# Patient Record
Sex: MALE | Race: WHITE | NOT HISPANIC OR LATINO
[De-identification: names, ages, dates, MRNs, and addresses within clinical notes are randomized per-mention and may not be internally consistent; named-entity substitution may affect disease eponyms.]

---

## 2017-05-10 ENCOUNTER — APPOINTMENT (OUTPATIENT)
Dept: ORTHOPEDIC SURGERY | Facility: CLINIC | Age: 37
End: 2017-05-10

## 2017-05-10 VITALS
HEIGHT: 68 IN | WEIGHT: 198 LBS | SYSTOLIC BLOOD PRESSURE: 134 MMHG | BODY MASS INDEX: 30.01 KG/M2 | HEART RATE: 69 BPM | DIASTOLIC BLOOD PRESSURE: 98 MMHG

## 2017-05-10 DIAGNOSIS — M50.20 OTHER CERVICAL DISC DISPLACEMENT, UNSPECIFIED CERVICAL REGION: ICD-10-CM

## 2017-06-21 ENCOUNTER — APPOINTMENT (OUTPATIENT)
Dept: ORTHOPEDIC SURGERY | Facility: CLINIC | Age: 37
End: 2017-06-21

## 2017-06-21 VITALS
HEIGHT: 68 IN | SYSTOLIC BLOOD PRESSURE: 135 MMHG | WEIGHT: 198 LBS | DIASTOLIC BLOOD PRESSURE: 85 MMHG | HEART RATE: 69 BPM | BODY MASS INDEX: 30.01 KG/M2

## 2017-06-21 DIAGNOSIS — M48.02 SPINAL STENOSIS, CERVICAL REGION: ICD-10-CM

## 2017-06-22 PROBLEM — M48.02 DEGENERATIVE CERVICAL SPINAL STENOSIS: Status: ACTIVE | Noted: 2017-05-10

## 2017-06-29 ENCOUNTER — APPOINTMENT (OUTPATIENT)
Dept: OPHTHALMOLOGY | Facility: CLINIC | Age: 37
End: 2017-06-29

## 2017-06-29 DIAGNOSIS — H53.8 OTHER VISUAL DISTURBANCES: ICD-10-CM

## 2018-01-15 ENCOUNTER — CHART COPY (OUTPATIENT)
Age: 38
End: 2018-01-15

## 2018-06-12 ENCOUNTER — APPOINTMENT (OUTPATIENT)
Dept: OPHTHALMOLOGY | Facility: CLINIC | Age: 38
End: 2018-06-12
Payer: MEDICARE

## 2018-06-12 DIAGNOSIS — H10.45 OTHER CHRONIC ALLERGIC CONJUNCTIVITIS: ICD-10-CM

## 2018-06-12 PROCEDURE — 92014 COMPRE OPH EXAM EST PT 1/>: CPT

## 2019-09-05 ENCOUNTER — APPOINTMENT (OUTPATIENT)
Dept: OPHTHALMOLOGY | Facility: CLINIC | Age: 39
End: 2019-09-05
Payer: MEDICARE

## 2019-09-05 ENCOUNTER — NON-APPOINTMENT (OUTPATIENT)
Age: 39
End: 2019-09-05

## 2019-09-05 PROCEDURE — 92014 COMPRE OPH EXAM EST PT 1/>: CPT

## 2019-09-05 PROCEDURE — 92133 CPTRZD OPH DX IMG PST SGM ON: CPT

## 2019-09-24 ENCOUNTER — NON-APPOINTMENT (OUTPATIENT)
Age: 39
End: 2019-09-24

## 2019-09-24 ENCOUNTER — APPOINTMENT (OUTPATIENT)
Dept: OPHTHALMOLOGY | Facility: CLINIC | Age: 39
End: 2019-09-24
Payer: MEDICARE

## 2019-09-24 PROCEDURE — 92083 EXTENDED VISUAL FIELD XM: CPT

## 2019-09-24 PROCEDURE — 92012 INTRM OPH EXAM EST PATIENT: CPT

## 2019-09-24 PROCEDURE — 92133 CPTRZD OPH DX IMG PST SGM ON: CPT

## 2020-09-08 ENCOUNTER — APPOINTMENT (OUTPATIENT)
Dept: OPHTHALMOLOGY | Facility: CLINIC | Age: 40
End: 2020-09-08
Payer: MEDICARE

## 2020-09-08 ENCOUNTER — NON-APPOINTMENT (OUTPATIENT)
Age: 40
End: 2020-09-08

## 2020-09-08 PROCEDURE — 92133 CPTRZD OPH DX IMG PST SGM ON: CPT

## 2020-09-08 PROCEDURE — 92014 COMPRE OPH EXAM EST PT 1/>: CPT

## 2021-09-25 ENCOUNTER — EMERGENCY (EMERGENCY)
Facility: HOSPITAL | Age: 41
LOS: 1 days | Discharge: HOME | End: 2021-09-25
Attending: STUDENT IN AN ORGANIZED HEALTH CARE EDUCATION/TRAINING PROGRAM
Payer: MEDICARE

## 2021-09-25 VITALS
HEART RATE: 82 BPM | SYSTOLIC BLOOD PRESSURE: 142 MMHG | OXYGEN SATURATION: 97 % | RESPIRATION RATE: 18 BRPM | TEMPERATURE: 98 F | DIASTOLIC BLOOD PRESSURE: 84 MMHG

## 2021-09-25 LAB — SARS-COV-2 RNA SPEC QL NAA+PROBE: SIGNIFICANT CHANGE UP

## 2021-09-25 PROCEDURE — 99283 EMERGENCY DEPT VISIT LOW MDM: CPT

## 2021-09-25 PROCEDURE — U0003: CPT

## 2021-09-25 PROCEDURE — U0005: CPT

## 2021-09-25 NOTE — ED STATDOCS - PATIENT PORTAL LINK FT
You can access the FollowMyHealth Patient Portal offered by [facility] by registering at the following website: [email or web address]. By joining [facility]’s FollowMyHealth portal, you will also be able to view your health information using other applications (apps) compatible with our system.

## 2021-09-25 NOTE — ED STATDOCS - PHYSICAL EXAMINATION
Vital Signs per nursing documentation  Gen: well appearing, no acute distress  HEENT: NCAT, MMM  Cardiac: regular rate rhythm, normal S1S2  Chest: clear to auscultation bilateral, no wheezes or crackles  Abdomen: soft, non tender non distended  Extremity: no gross deformity, good perfusion  Skin: no rash  Neuro: nonfocal neuro exam, gait steady Vital Signs per nursing documentation  Gen: well appearing, no acute distress  HEENT: NCAT, MMM  Cardiac: regular rate rhythm, normal S1S2  Chest: clear to auscultation bilateral, no wheezes or crackles  Abdomen: soft, non tender non distended  Extremity: no gross deformity  Skin: no rash  Neuro: nonfocal neuro exam, gait steady

## 2021-09-25 NOTE — ED STATDOCS - NS ED ROS FT
ROS:  GEN: (-) fevers/chills, (+) congestion  NECK: (-) stiffness, (-) swelling  RESP: (-) shortness of breath, (-) cough  CV: (-) chest pain, (-) palpitations  GI: (-) nausea, (-) vomiting, (-) pain, (-) constipation, (-) diarrhea  : (-) hematuria, (-) dysuria  EXT: (-) edema  NEURO: (-) weakness, (-) headache, (-) dizziness, (-) syncope  MSK: (-) muscle pain

## 2021-09-25 NOTE — ED ADULT TRIAGE NOTE - CHIEF COMPLAINT QUOTE
Patient here for COVID testing, patient is currently having symptoms. Pt reports congestions and headache

## 2021-11-18 ENCOUNTER — NON-APPOINTMENT (OUTPATIENT)
Age: 41
End: 2021-11-18

## 2021-11-18 ENCOUNTER — APPOINTMENT (OUTPATIENT)
Dept: OPHTHALMOLOGY | Facility: CLINIC | Age: 41
End: 2021-11-18
Payer: MEDICARE

## 2021-11-18 PROCEDURE — 92014 COMPRE OPH EXAM EST PT 1/>: CPT

## 2021-11-18 PROCEDURE — 92083 EXTENDED VISUAL FIELD XM: CPT

## 2022-08-02 PROBLEM — Z78.9 OTHER SPECIFIED HEALTH STATUS: Chronic | Status: ACTIVE | Noted: 2021-09-25

## 2022-09-20 ENCOUNTER — APPOINTMENT (OUTPATIENT)
Dept: ORTHOPEDIC SURGERY | Facility: CLINIC | Age: 42
End: 2022-09-20

## 2022-09-20 VITALS — BODY MASS INDEX: 33.04 KG/M2 | HEIGHT: 68 IN | WEIGHT: 218 LBS

## 2022-09-20 PROCEDURE — 99214 OFFICE O/P EST MOD 30 MIN: CPT

## 2022-09-20 PROCEDURE — 73562 X-RAY EXAM OF KNEE 3: CPT | Mod: RT

## 2022-09-20 RX ORDER — EPINEPHRINE 0.3 MG/.3ML
0.3 INJECTION INTRAMUSCULAR
Qty: 2 | Refills: 0 | Status: ACTIVE | COMMUNITY
Start: 2022-09-16

## 2022-09-20 RX ORDER — BUDESONIDE AND FORMOTEROL FUMARATE DIHYDRATE 160; 4.5 UG/1; UG/1
160-4.5 AEROSOL RESPIRATORY (INHALATION)
Qty: 10 | Refills: 0 | Status: ACTIVE | COMMUNITY
Start: 2022-05-17

## 2022-09-20 RX ORDER — MUPIROCIN 20 MG/G
2 OINTMENT TOPICAL
Qty: 22 | Refills: 0 | Status: ACTIVE | COMMUNITY
Start: 2022-09-12

## 2022-09-20 RX ORDER — LOTEPREDNOL ETABONATE 5 MG/G
0.5 OINTMENT OPHTHALMIC
Qty: 4 | Refills: 0 | Status: ACTIVE | COMMUNITY
Start: 2022-03-08

## 2022-09-20 RX ORDER — BUPROPION HYDROCHLORIDE 75 MG/1
75 TABLET, FILM COATED ORAL
Qty: 30 | Refills: 0 | Status: ACTIVE | COMMUNITY
Start: 2022-02-15

## 2022-09-20 RX ORDER — LEVALBUTEROL TARTRATE 45 UG/1
45 AEROSOL, METERED ORAL
Qty: 15 | Refills: 0 | Status: ACTIVE | COMMUNITY
Start: 2022-09-01

## 2022-09-20 RX ORDER — OMEPRAZOLE 20 MG/1
20 CAPSULE, DELAYED RELEASE ORAL
Qty: 30 | Refills: 0 | Status: ACTIVE | COMMUNITY
Start: 2022-05-10

## 2022-09-20 RX ORDER — CETIRIZINE HYDROCHLORIDE 5 MG/1
5 TABLET ORAL
Qty: 60 | Refills: 0 | Status: ACTIVE | COMMUNITY
Start: 2021-07-13

## 2022-09-20 RX ORDER — ECONAZOLE NITRATE 10 MG/G
1 CREAM TOPICAL
Qty: 85 | Refills: 0 | Status: ACTIVE | COMMUNITY
Start: 2022-05-16

## 2022-09-20 NOTE — IMAGING
[de-identified] : Normal gait\par \par Cervical spine\par Inspection normal\par Good active range of motion\par No tenderness\par -negative foraminal closing test bilaterally\par \par Right shoulder\par No swelling\par Mild tenderness anterior shoulder\par Active forward flexion 180°, external rotation 60°, internal rotation lower lumbar region\par Positive impingement 170°\par Supraspinatus strength 5 minus/5\par Radial pulse 2+\par \par Right knee\par No swelling\par Mild medial facet tenderness\par Mild lateral joint line tenderness\par Passive range of motion 0° to 125°\par Ligaments are stable\par Quad strength 5 minus/5\par \par Right leg\par No swelling\par Calf is soft and nontender\par Posterior tibial pulse 2+ [Right] : right knee [FreeTextEntry9] : Reviewed and interpreted.  Right knee AP standing, lateral, sunrise views-mild degenerative changes with narrowing of the lateral compartment on AP standing view, spurring patellofemoral joint

## 2022-09-20 NOTE — DISCUSSION/SUMMARY
[de-identified] : Continue home PT once a twice a week\par Ice p.r.n.\par Tylenol p.r.n.\par He will avoid irritating activities and exercises\par I discussed possible repeat Gelsyn injections right knee and possibly trying this in his right shoulder\par \par Impression:\par Status post injury right shoulder November 20, 2021\par Rotator cuff strain right shoulder/impingement/posterior subluxation/mild osteoarthritis\par Mild osteoarthritis right knee/chondromalacia patella Detail Level: Zone

## 2022-09-20 NOTE — HISTORY OF PRESENT ILLNESS
[Gradual] : gradual [5] : 5 [Dull/Aching] : dull/aching [Intermittent] : intermittent [Rest] : rest [Meds] : meds [Standing] : standing [Full time] : Work status: full time [de-identified] : Date of injury right shoulder November 20, 2021\par The patient has continued pain right shoulder.  Mild pain reaching above him and behind him.  No awakening from sleep at night.  No slipping\par He has continued pain right knee.  Mild pain standing and walking.  Pain with stairs and movie sign.  He did have some improvement after previous Gelsyn injections right knee.\par He has been doing PT there taking Tylenol p.r.n.  Seen today with his mother, Diane [] : Post Surgical Visit: no [FreeTextEntry9] : Tylenol [de-identified] : 10/12/2021 [de-identified] : Dr. Buitrago  [de-identified] : [employer] [de-identified] : 10/12/2021

## 2022-11-22 ENCOUNTER — NON-APPOINTMENT (OUTPATIENT)
Age: 42
End: 2022-11-22

## 2022-11-22 ENCOUNTER — APPOINTMENT (OUTPATIENT)
Dept: OPHTHALMOLOGY | Facility: CLINIC | Age: 42
End: 2022-11-22

## 2022-11-22 PROCEDURE — 92014 COMPRE OPH EXAM EST PT 1/>: CPT

## 2022-11-22 PROCEDURE — 92133 CPTRZD OPH DX IMG PST SGM ON: CPT

## 2022-11-22 PROCEDURE — 92083 EXTENDED VISUAL FIELD XM: CPT

## 2022-12-13 ENCOUNTER — APPOINTMENT (OUTPATIENT)
Dept: ORTHOPEDIC SURGERY | Facility: CLINIC | Age: 42
End: 2022-12-13

## 2022-12-13 VITALS — WEIGHT: 218 LBS | HEIGHT: 68 IN | BODY MASS INDEX: 33.04 KG/M2

## 2022-12-13 PROCEDURE — 99214 OFFICE O/P EST MOD 30 MIN: CPT

## 2022-12-13 RX ORDER — TOBRAMYCIN AND DEXAMETHASONE 3; 1 MG/ML; MG/ML
0.3-0.1 SUSPENSION/ DROPS OPHTHALMIC
Qty: 10 | Refills: 0 | Status: ACTIVE | COMMUNITY
Start: 2022-10-14

## 2022-12-13 RX ORDER — PREDNISONE 1 MG/1
1 TABLET ORAL
Qty: 50 | Refills: 0 | Status: COMPLETED | COMMUNITY
Start: 2022-07-15 | End: 2022-12-13

## 2022-12-13 RX ORDER — BUDESONIDE, GLYCOPYRROLATE, AND FORMOTEROL FUMARATE 160; 9; 4.8 UG/1; UG/1; UG/1
160-9-4.8 AEROSOL, METERED RESPIRATORY (INHALATION)
Qty: 11 | Refills: 0 | Status: ACTIVE | COMMUNITY
Start: 2022-12-02

## 2022-12-13 RX ORDER — LEVALBUTEROL HYDROCHLORIDE 0.63 MG/3ML
0.63 SOLUTION RESPIRATORY (INHALATION)
Qty: 180 | Refills: 0 | Status: ACTIVE | COMMUNITY
Start: 2022-12-02

## 2022-12-13 RX ORDER — ALBUTEROL SULFATE 2.5 MG/3ML
(2.5 MG/3ML) SOLUTION RESPIRATORY (INHALATION)
Qty: 150 | Refills: 0 | Status: ACTIVE | COMMUNITY
Start: 2022-10-10

## 2022-12-13 RX ORDER — MEPOLIZUMAB 100 MG/ML
INJECTION, POWDER, FOR SOLUTION SUBCUTANEOUS
Refills: 0 | Status: ACTIVE | COMMUNITY

## 2022-12-13 NOTE — IMAGING
[de-identified] : Normal gait\par \par Cervical spine\par Inspection normal\par Good active range of motion\par No tenderness\par -negative foraminal closing test bilaterally\par \par Thoracic spine\par Inspection normal\par Minimal tenderness paraspinals upper thoracic region \par Straight leg raising is negative bilaterally\par \par Right shoulder\par No swelling\par Mild tenderness anterior shoulder\par Active forward flexion 180°, external rotation 60°, internal rotation lower lumbar region\par Positive impingement 170°\par Supraspinatus strength 5 minus/5\par Radial pulse 2+\par \par Right knee\par No swelling\par Slight medial facet tenderness\par Slight lateral joint line tenderness\par Passive range of motion 0° to 125°\par Ligaments are stable\par Quad strength 4+/5\par \par Right leg\par No swelling\par Calf is soft and nontender\par Posterior tibial pulse 2+

## 2022-12-13 NOTE — HISTORY OF PRESENT ILLNESS
[Right Arm] : right arm [Gradual] : gradual [7] : 7 [Dull/Aching] : dull/aching [Intermittent] : intermittent [Household chores] : household chores [Leisure] : leisure [Rest] : rest [Exercising] : exercising [Full time] : Work status: full time [de-identified] : The patient has occasional mild pain right shoulder with activities.  No slipping.  No awakening from sleep at night\par He has occasional mild pain and discomfort in his mid back region.  No radicular complaints.  No chest pain or shortness of breath\par Occasional mild pain right knee with activities.  He does feel better after Gelsyn injections.  Doing home PT.  Taking Tylenol p.r.n.  Seen today with his mother, Diane [] : Post Surgical Visit: no [de-identified] : 9/20/2022 [de-identified] : Dr. Buitrago

## 2022-12-13 NOTE — DISCUSSION/SUMMARY
[de-identified] : Continue home PT once a twice a week\par Ice p.r.n.\par Tylenol p.r.n.\par He will avoid irritating activities and exercises\par I discussed possible repeat Gelsyn injections right knee if his symptoms worsen\par \par Impression:\par Status post injury right shoulder November 20, 2021\par Rotator cuff strain right shoulder/impingement/posterior subluxation/mild osteoarthritis\par Thoracic strain/spondylosis\par Mild osteoarthritis right knee/chondromalacia patella

## 2023-01-03 ENCOUNTER — NON-APPOINTMENT (OUTPATIENT)
Age: 43
End: 2023-01-03

## 2023-01-03 ENCOUNTER — APPOINTMENT (OUTPATIENT)
Dept: OPHTHALMOLOGY | Facility: CLINIC | Age: 43
End: 2023-01-03
Payer: MEDICARE

## 2023-01-03 PROCEDURE — 92133 CPTRZD OPH DX IMG PST SGM ON: CPT

## 2023-01-03 PROCEDURE — 99214 OFFICE O/P EST MOD 30 MIN: CPT

## 2023-01-03 PROCEDURE — 92083 EXTENDED VISUAL FIELD XM: CPT

## 2023-03-28 ENCOUNTER — APPOINTMENT (OUTPATIENT)
Dept: ORTHOPEDIC SURGERY | Facility: CLINIC | Age: 43
End: 2023-03-28
Payer: MEDICARE

## 2023-03-28 VITALS — BODY MASS INDEX: 33.04 KG/M2 | HEIGHT: 68 IN | WEIGHT: 218 LBS

## 2023-03-28 PROCEDURE — 99214 OFFICE O/P EST MOD 30 MIN: CPT

## 2023-03-28 RX ORDER — COVID-19 ANTIGEN TEST
KIT MISCELLANEOUS
Qty: 8 | Refills: 0 | Status: COMPLETED | COMMUNITY
Start: 2022-11-01 | End: 2023-03-28

## 2023-03-28 RX ORDER — LOSARTAN POTASSIUM 100 MG/1
100 TABLET, FILM COATED ORAL
Refills: 0 | Status: ACTIVE | COMMUNITY

## 2023-03-28 RX ORDER — LOSARTAN POTASSIUM 50 MG/1
50 TABLET, FILM COATED ORAL
Qty: 30 | Refills: 0 | Status: COMPLETED | COMMUNITY
Start: 2022-07-19 | End: 2023-03-28

## 2023-03-28 RX ORDER — LOSARTAN POTASSIUM 25 MG/1
25 TABLET, FILM COATED ORAL
Qty: 30 | Refills: 0 | Status: COMPLETED | COMMUNITY
Start: 2022-06-07 | End: 2023-03-28

## 2023-03-28 NOTE — IMAGING
[de-identified] : Normal gait\par \par Cervical spine\par Inspection normal\par Good active range of motion\par No tenderness\par -negative foraminal closing test bilaterally\par \par Thoracic spine\par Inspection normal\par Minimal tenderness paraspinals upper thoracic region \par Straight leg raising is negative bilaterally\par \par Right shoulder\par No swelling\par Mild tenderness anterior shoulder\par Active forward flexion 170°, external rotation 60°, internal rotation lower lumbar region\par Positive impingement 170°\par Supraspinatus strength 5 minus/5\par Radial pulse 2+\par \par Right knee\par No swelling\par Slight medial facet tenderness\par Slight lateral joint line tenderness\par Passive range of motion 0° to 125°\par Ligaments are stable\par Quad strength 4+/5\par \par Right leg\par No swelling\par Calf is soft and nontender\par Posterior tibial pulse 2+

## 2023-03-28 NOTE — DISCUSSION/SUMMARY
[de-identified] : Continue home PT once a twice a week\par Ice p.r.n.\par Tylenol p.r.n.\par He will avoid irritating activities and exercises\par Again, I discussed possible repeat Gelsyn injections right knee if his symptoms worsen\par \par Impression:\par Status post injury right shoulder November 20, 2021\par Rotator cuff strain right shoulder/impingement/posterior subluxation/mild osteoarthritis\par Thoracic strain/spondylosis\par Mild osteoarthritis right knee/chondromalacia patella

## 2023-03-28 NOTE — HISTORY OF PRESENT ILLNESS
[Gradual] : gradual [5] : 5 [Dull/Aching] : dull/aching [Intermittent] : intermittent [Physical therapy] : physical therapy [Standing] : standing [Full time] : Work status: full time [de-identified] : The patient has been doing home physical therapy.  He is feeling a little better\par He has mild pain right shoulder with movement.  Mild stiffness in his shoulder.  No awakening from sleep at night.\par He has mild occasional pain in his mid back region with change in position.  No radicular complaints.  No chest pain or shortness of breath\par He has occasional mild pain in his knee.  Mild pain standing and walking.  Mild pain with stairs and movie sign.  Taking Tylenol as needed.  Seen today with his mother, Diane [] : Post Surgical Visit: no [de-identified] : 12/13/22 [de-identified] : Dr. Buitrago

## 2023-07-10 ENCOUNTER — NON-APPOINTMENT (OUTPATIENT)
Age: 43
End: 2023-07-10

## 2023-07-18 ENCOUNTER — APPOINTMENT (OUTPATIENT)
Dept: ORTHOPEDIC SURGERY | Facility: CLINIC | Age: 43
End: 2023-07-18
Payer: MEDICARE

## 2023-07-18 VITALS — HEIGHT: 68 IN | BODY MASS INDEX: 33.04 KG/M2 | WEIGHT: 218 LBS

## 2023-07-18 PROCEDURE — 99214 OFFICE O/P EST MOD 30 MIN: CPT

## 2023-07-18 NOTE — DISCUSSION/SUMMARY
[de-identified] : Continue home OT once a twice a week\par Ice p.r.n.\par Tylenol p.r.n.\par He will avoid irritating activities and exercises\par Again, I discussed possible repeat Gelsyn injections right knee if his symptoms worsen\par \par Impression:\par Status post injury right shoulder November 20, 2021\par Rotator cuff strain right shoulder/impingement/posterior subluxation/mild osteoarthritis\par Thoracic strain/spondylosis\par Mild osteoarthritis right knee/chondromalacia patella

## 2023-07-18 NOTE — IMAGING
[de-identified] : Normal gait\par \par Cervical spine\par Inspection normal\par Good active range of motion\par No tenderness\par -negative foraminal closing test bilaterally\par \par Thoracic spine\par Inspection normal.\par No tenderness\par Straight leg raising is negative bilaterally\par \par Right shoulder\par No swelling\par Slight tenderness anterior shoulder\par Active forward flexion 170°, external rotation 60°, internal rotation lower lumbar region\par Positive impingement 170°\par Supraspinatus strength 5 minus/5\par Radial pulse 2+\par \par Right knee\par No swelling\par Slight medial facet tenderness\par Slight lateral joint line tenderness\par Passive range of motion 0° to 125°\par Ligaments are stable\par Quad strength 5 -/5\par \par Right leg\par No swelling\par Calf is soft and nontender\par Posterior tibial pulse 2+

## 2023-07-18 NOTE — HISTORY OF PRESENT ILLNESS
[Gradual] : gradual [5] : 5 [Dull/Aching] : dull/aching [Intermittent] : intermittent [Stairs] : stairs [Full time] : Work status: full time [de-identified] : The patient has been doing home therapy once or twice a week.  Doing home exercises\par He has mild occasional pain right shoulder with movement.  No awakening from sleep at night\par He has mild discomfort occasionally in his mid back region.  No pain.  No radicular complaints.  No chest pain or shortness of breath\par He has mild occasional pain right knee with stairs.\par Seen today with his mother, Diane [] : Post Surgical Visit: no [de-identified] : 3/28/23 [de-identified] : Dr. Buitrago [de-identified] : 10/2021

## 2023-07-18 NOTE — REASON FOR VISIT
[Parent] : parent [FreeTextEntry2] : Continued occasional pain right shoulder mid back and right knee

## 2023-11-14 ENCOUNTER — APPOINTMENT (OUTPATIENT)
Dept: ORTHOPEDIC SURGERY | Facility: CLINIC | Age: 43
End: 2023-11-14
Payer: MEDICARE

## 2023-11-14 ENCOUNTER — APPOINTMENT (OUTPATIENT)
Dept: MRI IMAGING | Facility: CLINIC | Age: 43
End: 2023-11-14
Payer: MEDICARE

## 2023-11-14 VITALS — HEIGHT: 68 IN | BODY MASS INDEX: 33.04 KG/M2 | WEIGHT: 218 LBS

## 2023-11-14 DIAGNOSIS — M75.41 IMPINGEMENT SYNDROME OF RIGHT SHOULDER: ICD-10-CM

## 2023-11-14 PROCEDURE — 73030 X-RAY EXAM OF SHOULDER: CPT | Mod: RT

## 2023-11-14 PROCEDURE — 99214 OFFICE O/P EST MOD 30 MIN: CPT

## 2023-11-14 PROCEDURE — 72148 MRI LUMBAR SPINE W/O DYE: CPT | Mod: MH

## 2023-11-14 PROCEDURE — 72100 X-RAY EXAM L-S SPINE 2/3 VWS: CPT

## 2023-11-14 PROCEDURE — 72070 X-RAY EXAM THORAC SPINE 2VWS: CPT

## 2023-11-14 PROCEDURE — 72170 X-RAY EXAM OF PELVIS: CPT

## 2023-11-16 ENCOUNTER — NON-APPOINTMENT (OUTPATIENT)
Age: 43
End: 2023-11-16

## 2023-12-14 ENCOUNTER — APPOINTMENT (OUTPATIENT)
Dept: ORTHOPEDIC SURGERY | Facility: CLINIC | Age: 43
End: 2023-12-14
Payer: MEDICARE

## 2023-12-14 VITALS — HEIGHT: 68 IN | BODY MASS INDEX: 33.04 KG/M2 | WEIGHT: 218 LBS

## 2023-12-14 PROCEDURE — 99214 OFFICE O/P EST MOD 30 MIN: CPT

## 2023-12-14 NOTE — REASON FOR VISIT
[FreeTextEntry2] : Decreased pain right shoulder, mid back, lower back Occasional mild right knee pain

## 2023-12-14 NOTE — HISTORY OF PRESENT ILLNESS
[Mid-back] : mid-back [Lower back] : lower back [Right Arm] : right arm [Gradual] : gradual [3] : 3 [1] : 2 [Dull/Aching] : dull/aching [Intermittent] : intermittent [Leisure] : leisure [Rest] : rest [Physical therapy] : physical therapy [Exercising] : exercising [Not working due to injury] : Work status: not working due to injury [de-identified] : Date of injury November 12, 2023 The patient has been going to physical therapy.  He is feeling better.  He has mild occasional pain right shoulder with movement.  No awakening from sleep at night He has minimal pain in his mid lower back with change in position.  No radicular complaints Mild occasional pain right knee standing and walking He had MRI lumbosacral spine Seen today with his mother, Diane [de-identified] : 11/14/2023 [] : Post Surgical Visit: no [de-identified] : Dr. Buitrago

## 2023-12-14 NOTE — DISCUSSION/SUMMARY
[de-identified] : MRI lumbosacral spine was discussed with the patient and his mother Continue physical therapy He may return to work December 15, 2023 without restrictions.  He can use copper back support when working.  He will use this over the next 6 weeks then discontinue Ice for his right shoulder and right knee prn Moist heat to his back prn Tylenol p.r.n. He will avoid irritating activities and exercises  Impression: Status post fall November 12, 2023 Rotator cuff strain right shoulder/impingement/posterior subluxation/mild osteoarthritis Thoracic strain/spondylosis Lumbosacral strain/spondylosis/mild central disc herniation L5-S1 Mild osteoarthritis right knee/chondromalacia patella

## 2023-12-14 NOTE — DATA REVIEWED
[MRI] : MRI [Lumbar Spine] : lumbar spine [I independently reviewed and interpreted images and report] : I independently reviewed and interpreted images and report [FreeTextEntry1] : MRI lumbosacral spine done November 14, 2023 was reviewed. There is mild central disc protrusion L5-S1 with mild stenosis. Mild multilevel degenerative disc disease

## 2023-12-14 NOTE — IMAGING
[de-identified] : Normal gait  Thoracic spine Inspection normal. No tenderness Straight leg raising is negative bilaterally  Lumbosacral spine Inspection normal No tenderness Motor lower extremities-right quads 5 -/5, remainder normal  Hips Full painless passive range of motion.  No tenderness  Right shoulder No swelling Slight tenderness anterior shoulder Active forward flexion 170, external rotation 60, internal rotation lower lumbar region Positive impingement 170 Supraspinatus strength 5 minus/5 Radial pulse 2+  Right knee No swelling Slight medial facet tenderness Slight lateral joint line tenderness Passive range of motion 0 to 125 degrees Ligaments are stable   Right leg No swelling Calf is soft and nontender Posterior tibial pulse 2+

## 2024-01-02 ENCOUNTER — APPOINTMENT (OUTPATIENT)
Dept: OPHTHALMOLOGY | Facility: CLINIC | Age: 44
End: 2024-01-02
Payer: MEDICARE

## 2024-01-02 ENCOUNTER — NON-APPOINTMENT (OUTPATIENT)
Age: 44
End: 2024-01-02

## 2024-01-02 PROCEDURE — 92133 CPTRZD OPH DX IMG PST SGM ON: CPT

## 2024-01-02 PROCEDURE — 92014 COMPRE OPH EXAM EST PT 1/>: CPT

## 2024-02-06 ENCOUNTER — APPOINTMENT (OUTPATIENT)
Dept: ORTHOPEDIC SURGERY | Facility: CLINIC | Age: 44
End: 2024-02-06
Payer: MEDICARE

## 2024-02-06 VITALS — HEIGHT: 68 IN | WEIGHT: 218 LBS | BODY MASS INDEX: 33.04 KG/M2

## 2024-02-06 DIAGNOSIS — M51.26 OTHER INTERVERTEBRAL DISC DISPLACEMENT, LUMBAR REGION: ICD-10-CM

## 2024-02-06 DIAGNOSIS — M75.51 BURSITIS OF RIGHT SHOULDER: ICD-10-CM

## 2024-02-06 PROCEDURE — 99213 OFFICE O/P EST LOW 20 MIN: CPT

## 2024-02-06 NOTE — DISCUSSION/SUMMARY
[de-identified] : Continue physical therapy Ice for his right shoulder and right knee prn Moist heat to his back prn Tylenol p.r.n. He will avoid irritating activities and exercises I discussed possible Euflexxa injections right shoulder if symptoms worsen  Impression: Status post fall November 12, 2023 Rotator cuff strain right shoulder/impingement/posterior subluxation/mild osteoarthritis Thoracic strain/spondylosis Lumbosacral strain/spondylosis/mild central disc herniation L5-S1 Mild osteoarthritis right knee/chondromalacia patella

## 2024-02-06 NOTE — REASON FOR VISIT
[Parent] : parent [FreeTextEntry2] : Continued pain right shoulder, mid and lower back and right knee

## 2024-02-06 NOTE — IMAGING
[de-identified] : Normal gait  Thoracic spine Inspection normal. No tenderness Straight leg raising is negative bilaterally  Lumbosacral spine Inspection normal No tenderness Motor lower extremities-right quads 5 -/5, remainder normal  Hips Full painless passive range of motion.  No tenderness  Right shoulder No swelling Slight tenderness anterior shoulder Active forward flexion 170, external rotation 60, internal rotation lower lumbar region Negative impingement Supraspinatus strength 5 minus/5 Radial pulse 2+  Right knee No swelling Slight medial facet tenderness Slight lateral joint line tenderness Passive range of motion 0 to 125 degrees Ligaments are stable   Right leg No swelling Calf is soft and nontender Posterior tibial pulse 2+

## 2024-02-06 NOTE — HISTORY OF PRESENT ILLNESS
[Gradual] : gradual [Dull/Aching] : dull/aching [Constant] : constant [de-identified] : Date of injury November 12, 2023 The patient has been going to physical therapy.  He has been improving slowly.  He has mild occasional pain right shoulder with movement.  No awakening from sleep at night He has minimal pain in his mid and lower back with change in position.  No radicular complaints.  No chest pain or shortness of breath Mild occasional pain right knee standing and walking Seen today with his mother, Diane [] : Post Surgical Visit: no [de-identified] : 12/14/23 [de-identified] : Dr. Buitrago

## 2024-03-05 ENCOUNTER — APPOINTMENT (OUTPATIENT)
Dept: OPHTHALMOLOGY | Facility: CLINIC | Age: 44
End: 2024-03-05
Payer: MEDICARE

## 2024-03-05 ENCOUNTER — NON-APPOINTMENT (OUTPATIENT)
Age: 44
End: 2024-03-05

## 2024-03-05 PROCEDURE — 92083 EXTENDED VISUAL FIELD XM: CPT

## 2024-03-05 PROCEDURE — 92012 INTRM OPH EXAM EST PATIENT: CPT

## 2024-05-14 ENCOUNTER — APPOINTMENT (OUTPATIENT)
Dept: ORTHOPEDIC SURGERY | Facility: CLINIC | Age: 44
End: 2024-05-14
Payer: MEDICARE

## 2024-05-14 VITALS — WEIGHT: 218 LBS | BODY MASS INDEX: 33.04 KG/M2 | HEIGHT: 68 IN

## 2024-05-14 DIAGNOSIS — M47.817 SPONDYLOSIS W/OUT MYELOPATHY OR RADICULOPATHY, LUMBOSACRAL REGION: ICD-10-CM

## 2024-05-14 DIAGNOSIS — S39.012D STRAIN OF MUSCLE, FASCIA AND TENDON OF LOWER BACK, SUBSEQUENT ENCOUNTER: ICD-10-CM

## 2024-05-14 DIAGNOSIS — S29.019D STRAIN OF MUSCLE AND TENDON OF UNSPECIFIED WALL OF THORAX, SUBSEQUENT ENCOUNTER: ICD-10-CM

## 2024-05-14 DIAGNOSIS — M22.41 CHONDROMALACIA PATELLAE, RIGHT KNEE: ICD-10-CM

## 2024-05-14 DIAGNOSIS — M17.11 UNILATERAL PRIMARY OSTEOARTHRITIS, RIGHT KNEE: ICD-10-CM

## 2024-05-14 DIAGNOSIS — M51.24 OTHER INTERVERTEBRAL DISC DISPLACEMENT, THORACIC REGION: ICD-10-CM

## 2024-05-14 DIAGNOSIS — M19.011 PRIMARY OSTEOARTHRITIS, RIGHT SHOULDER: ICD-10-CM

## 2024-05-14 PROCEDURE — 99213 OFFICE O/P EST LOW 20 MIN: CPT

## 2024-05-14 RX ORDER — FERROUS SULFATE 325(65) MG
325 TABLET ORAL
Refills: 0 | Status: ACTIVE | COMMUNITY

## 2024-05-14 NOTE — DISCUSSION/SUMMARY
[de-identified] : Continue physical therapy Ice for his right shoulder and right knee prn Moist heat to his back prn Tylenol p.r.n. He will avoid irritating activities and exercises Again, I discussed possible Euflexxa injections right shoulder and right knee if symptoms worsen  Impression: Rotator cuff strain right shoulder/impingement/posterior subluxation/mild osteoarthritis Thoracic strain/spondylosis Lumbosacral strain/spondylosis/mild central disc herniation L5-S1 Mild osteoarthritis right knee/chondromalacia patella

## 2024-05-14 NOTE — HISTORY OF PRESENT ILLNESS
[Gradual] : gradual [Dull/Aching] : dull/aching [Constant] : constant [de-identified] : The patient has been going to physical therapy.  He says he has been feeling the same.  He has mild occasional pain right shoulder with movement.  No awakening from sleep at night He has minimal pain in his mid and lower back with change in position.  No radicular complaints.  No chest pain or shortness of breath Mild occasional pain right knee standing and walking.  Mild occasional pain with stairs Seen today with his mother, Diane [] : Post Surgical Visit: no [de-identified] : 2/6/2024 [de-identified] : Dr. Buitrago

## 2024-05-14 NOTE — IMAGING
[de-identified] : Normal gait  Thoracic spine Inspection normal. No tenderness Straight leg raising is negative bilaterally  Lumbosacral spine Inspection normal No tenderness Motor lower extremities-right quads 5 -/5, remainder normal  Hips Full painless passive range of motion.  No tenderness  Right shoulder No swelling Slight tenderness anterior shoulder Active forward flexion 170, external rotation 60, internal rotation lower lumbar region Negative impingement Supraspinatus strength 5 minus/5 Radial pulse 2+  Right knee No swelling Slight medial facet tenderness Passive range of motion 0 to 125 degrees Ligaments are stable   Right leg No swelling Calf is soft and nontender Posterior tibial pulse 2+

## 2024-08-13 ENCOUNTER — APPOINTMENT (OUTPATIENT)
Dept: ORTHOPEDIC SURGERY | Facility: CLINIC | Age: 44
End: 2024-08-13
Payer: MEDICARE

## 2024-08-13 VITALS — HEIGHT: 68 IN | BODY MASS INDEX: 33.04 KG/M2 | WEIGHT: 218 LBS

## 2024-08-13 DIAGNOSIS — M17.11 UNILATERAL PRIMARY OSTEOARTHRITIS, RIGHT KNEE: ICD-10-CM

## 2024-08-13 DIAGNOSIS — S39.012D STRAIN OF MUSCLE, FASCIA AND TENDON OF LOWER BACK, SUBSEQUENT ENCOUNTER: ICD-10-CM

## 2024-08-13 DIAGNOSIS — M75.51 BURSITIS OF RIGHT SHOULDER: ICD-10-CM

## 2024-08-13 DIAGNOSIS — M47.814 SPONDYLOSIS W/OUT MYELOPATHY OR RADICULOPATHY, THORACIC REGION: ICD-10-CM

## 2024-08-13 DIAGNOSIS — M47.817 SPONDYLOSIS W/OUT MYELOPATHY OR RADICULOPATHY, LUMBOSACRAL REGION: ICD-10-CM

## 2024-08-13 DIAGNOSIS — S29.019D STRAIN OF MUSCLE AND TENDON OF UNSPECIFIED WALL OF THORAX, SUBSEQUENT ENCOUNTER: ICD-10-CM

## 2024-08-13 DIAGNOSIS — M22.41 CHONDROMALACIA PATELLAE, RIGHT KNEE: ICD-10-CM

## 2024-08-13 PROCEDURE — 99213 OFFICE O/P EST LOW 20 MIN: CPT

## 2024-08-13 NOTE — DISCUSSION/SUMMARY
[de-identified] : Continue physical therapy Ice for his right shoulder and right knee prn Moist heat to his back prn Tylenol p.r.n. He will avoid irritating activities and exercises Again, I discussed possible Euflexxa injections right shoulder and right knee if symptoms worsen  Impression: Rotator cuff strain right shoulder/impingement/posterior subluxation/mild osteoarthritis Thoracic strain/spondylosis Lumbosacral strain/spondylosis/mild central disc herniation L5-S1 Mild osteoarthritis right knee/chondromalacia patella

## 2024-08-13 NOTE — IMAGING
[de-identified] : Normal gait  Thoracic spine Inspection normal. No tenderness Straight leg raising is negative bilaterally  Lumbosacral spine Inspection normal No tenderness Motor lower extremities-right quads 5 -/5, remainder normal  Hips Full painless passive range of motion.  No tenderness  Right shoulder No swelling No tenderness Active forward flexion 170, external rotation 60, internal rotation lower lumbar region Negative impingement Supraspinatus strength 5 minus/5 Radial pulse 2+  Right knee No swelling Slight medial facet tenderness Passive range of motion 0 to 125 degrees Ligaments are stable   Right leg No swelling Calf is soft and nontender Posterior tibial pulse 2+

## 2024-08-13 NOTE — HISTORY OF PRESENT ILLNESS
[Gradual] : gradual [Dull/Aching] : dull/aching [Constant] : constant [de-identified] : The patient has been going to physical therapy.  He feels better since last visit.  He has mild occasional pain right shoulder with movement.  No awakening from sleep at night He has minimal pain in his mid and lower back with change in position.  No radicular complaints.  No chest pain or shortness of breath Mild occasional pain right knee standing and walking.  Mild occasional pain with stairs Seen today with his mother, Diane [] : Post Surgical Visit: no [de-identified] : 5/14/2024 [de-identified] : Dr. Buitrago

## 2024-11-19 ENCOUNTER — APPOINTMENT (OUTPATIENT)
Dept: ORTHOPEDIC SURGERY | Facility: CLINIC | Age: 44
End: 2024-11-19
Payer: MEDICARE

## 2024-11-19 VITALS — WEIGHT: 218 LBS | BODY MASS INDEX: 33.04 KG/M2 | HEIGHT: 68 IN

## 2024-11-19 DIAGNOSIS — M47.814 SPONDYLOSIS W/OUT MYELOPATHY OR RADICULOPATHY, THORACIC REGION: ICD-10-CM

## 2024-11-19 DIAGNOSIS — M47.817 SPONDYLOSIS W/OUT MYELOPATHY OR RADICULOPATHY, LUMBOSACRAL REGION: ICD-10-CM

## 2024-11-19 DIAGNOSIS — M75.51 BURSITIS OF RIGHT SHOULDER: ICD-10-CM

## 2024-11-19 DIAGNOSIS — M22.41 CHONDROMALACIA PATELLAE, RIGHT KNEE: ICD-10-CM

## 2024-11-19 DIAGNOSIS — S29.019D STRAIN OF MUSCLE AND TENDON OF UNSPECIFIED WALL OF THORAX, SUBSEQUENT ENCOUNTER: ICD-10-CM

## 2024-11-19 DIAGNOSIS — S39.012D STRAIN OF MUSCLE, FASCIA AND TENDON OF LOWER BACK, SUBSEQUENT ENCOUNTER: ICD-10-CM

## 2024-11-19 DIAGNOSIS — M17.11 UNILATERAL PRIMARY OSTEOARTHRITIS, RIGHT KNEE: ICD-10-CM

## 2024-11-19 DIAGNOSIS — M19.011 PRIMARY OSTEOARTHRITIS, RIGHT SHOULDER: ICD-10-CM

## 2024-11-19 PROCEDURE — 99213 OFFICE O/P EST LOW 20 MIN: CPT

## 2024-12-03 ENCOUNTER — APPOINTMENT (OUTPATIENT)
Dept: UROLOGY | Facility: CLINIC | Age: 44
End: 2024-12-03

## 2025-01-07 ENCOUNTER — APPOINTMENT (OUTPATIENT)
Dept: OPHTHALMOLOGY | Facility: CLINIC | Age: 45
End: 2025-01-07
Payer: MEDICARE

## 2025-01-07 ENCOUNTER — NON-APPOINTMENT (OUTPATIENT)
Age: 45
End: 2025-01-07

## 2025-01-07 PROCEDURE — 92015 DETERMINE REFRACTIVE STATE: CPT | Mod: NC

## 2025-01-07 PROCEDURE — 92133 CPTRZD OPH DX IMG PST SGM ON: CPT

## 2025-01-07 PROCEDURE — 92014 COMPRE OPH EXAM EST PT 1/>: CPT

## 2025-01-27 ENCOUNTER — EMERGENCY (EMERGENCY)
Facility: HOSPITAL | Age: 45
LOS: 1 days | End: 2025-01-27
Attending: EMERGENCY MEDICINE
Payer: MEDICARE

## 2025-01-27 VITALS
TEMPERATURE: 99 F | WEIGHT: 205.03 LBS | HEIGHT: 68 IN | RESPIRATION RATE: 18 BRPM | OXYGEN SATURATION: 99 % | DIASTOLIC BLOOD PRESSURE: 83 MMHG | HEART RATE: 85 BPM | SYSTOLIC BLOOD PRESSURE: 116 MMHG

## 2025-01-27 PROCEDURE — 99284 EMERGENCY DEPT VISIT MOD MDM: CPT

## 2025-01-27 NOTE — ED ADULT TRIAGE NOTE - CHIEF COMPLAINT QUOTE
BIBA from [facility] home with complaints of back pain started this AM denies trauma.

## 2025-01-28 VITALS
HEART RATE: 82 BPM | DIASTOLIC BLOOD PRESSURE: 90 MMHG | SYSTOLIC BLOOD PRESSURE: 134 MMHG | TEMPERATURE: 98 F | RESPIRATION RATE: 18 BRPM | OXYGEN SATURATION: 97 %

## 2025-01-28 PROCEDURE — 96372 THER/PROPH/DIAG INJ SC/IM: CPT

## 2025-01-28 PROCEDURE — 99283 EMERGENCY DEPT VISIT LOW MDM: CPT | Mod: 25

## 2025-01-28 PROCEDURE — 72100 X-RAY EXAM L-S SPINE 2/3 VWS: CPT

## 2025-01-28 PROCEDURE — 72100 X-RAY EXAM L-S SPINE 2/3 VWS: CPT | Mod: 26

## 2025-01-28 RX ORDER — CYCLOBENZAPRINE HCL 10 MG
1 TABLET ORAL
Qty: 10 | Refills: 0
Start: 2025-01-28 | End: 2025-02-01

## 2025-01-28 RX ORDER — KETOROLAC TROMETHAMINE 30 MG/ML
30 INJECTION INTRAMUSCULAR; INTRAVENOUS ONCE
Refills: 0 | Status: DISCONTINUED | OUTPATIENT
Start: 2025-01-28 | End: 2025-01-28

## 2025-01-28 RX ORDER — OXYCODONE AND ACETAMINOPHEN 5; 325 MG/1; MG/1
1 TABLET ORAL ONCE
Refills: 0 | Status: DISCONTINUED | OUTPATIENT
Start: 2025-01-28 | End: 2025-01-28

## 2025-01-28 RX ORDER — OXYCODONE AND ACETAMINOPHEN 5; 325 MG/1; MG/1
1 TABLET ORAL
Qty: 20 | Refills: 0
Start: 2025-01-28 | End: 2025-02-01

## 2025-01-28 RX ADMIN — OXYCODONE AND ACETAMINOPHEN 1 TABLET(S): 5; 325 TABLET ORAL at 00:30

## 2025-01-28 RX ADMIN — KETOROLAC TROMETHAMINE 30 MILLIGRAM(S): 30 INJECTION INTRAMUSCULAR; INTRAVENOUS at 00:31

## 2025-01-28 NOTE — ED PROVIDER NOTE - OBJECTIVE STATEMENT
44-year-old male presents to the ED with complaints of right lower back pain radiating down the right leg that woke him up from sleep approximately 12 hours prior to arrival.  Patient states he does have arthritis in his low back and in the right knee.  Patient denies any bowel or bladder incontinence.  Patient took some Tylenol with little relief of symptoms.  Patient denies any dysuria.

## 2025-01-28 NOTE — ED ADULT NURSE NOTE - SKIN INTEGRITY
----- Message from Rhonda Obando NP sent at 11/16/2020  2:35 PM CST -----  Message patient--needs follow up appointment with neurology -was seeing kristal for headaches   intact

## 2025-01-28 NOTE — ED ADULT NURSE NOTE - NS PRO PASSIVE SMOKE EXP
Unknown
Detail Level: Zone
Quality 226: Preventive Care And Screening: Tobacco Use: Screening And Cessation Intervention: Patient screened for tobacco use and is an ex/non-smoker
Quality 265: Biopsy Follow-Up: Biopsy results reviewed, communicated, tracked, and documented

## 2025-01-28 NOTE — ED ADULT NURSE NOTE - OBJECTIVE STATEMENT
Patient complaining of lower back pain radiating down to bottom. States starting this morning. very uncomfortable. from group home.

## 2025-01-28 NOTE — ED PROVIDER NOTE - NSFOLLOWUPINSTRUCTIONS_ED_ALL_ED_FT
Sciatica  Back view of a person showing a normal leg and a leg affected by the pain of sciatica.  Sciatica is pain, weakness, tingling, or loss of feeling (numbness) along the sciatic nerve. The sciatic nerve starts in the lower back and goes down the back of each leg. Sciatica usually affects one side of the body.    Sciatica usually goes away on its own or with treatment. Sometimes, sciatica may come back.    What are the causes?  This condition happens when the sciatic nerve is pinched or has pressure put on it. This may be caused by:  A disk in between the bones of the spine bulging out too far (herniated disk).  Changes in the spinal disks due to aging.  A condition that affects a muscle in the butt.  Extra bone growth near the sciatic nerve.  A break (fracture) of the area between your hip bones (pelvis).  Pregnancy.  Tumor. This is rare.  What increases the risk?  You are more likely to develop this condition if you:  Play sports that put pressure or stress on the spine.  Have poor strength and ease of movement (flexibility).  Have had a back injury or back surgery.  Sit for long periods of time.  Do activities that involve bending or lifting over and over again.  Are very overweight (obese).  What are the signs or symptoms?  Symptoms can vary from mild to very bad. They may include:  Any of these problems in the lower back, leg, hip, or butt:  Mild tingling, loss of feeling, or dull aches.  A burning feeling.  Sharp pains.  Loss of feeling in the back of the calf or the sole of the foot.  Leg weakness.  Very bad back pain that makes it hard to move.  These symptoms may get worse when you cough, sneeze, or laugh. They may also get worse when you sit or stand for long periods of time.    How is this treated?  This condition often gets better without any treatment. However, treatment may include:  Changing or cutting back on physical activity when you have pain.  Exercising, including strengthening and stretching.  Putting ice or heat on the affected area.  Shots of medicines to relieve pain and swelling or to relax your muscles.  Surgery.  Follow these instructions at home:  Medicines    Take over-the-counter and prescription medicines only as told by your doctor.  Ask your doctor if you should avoid driving or using machines while you are taking your medicine.  Managing pain    Bag of ice on a towel on the skin.  A heating pad for use on the affected area.  If told, put ice on the affected area. To do this:  Put ice in a plastic bag.  Place a towel between your skin and the bag.  Leave the ice on for 20 minutes, 2–3 times a day.  If your skin turns bright red, take off the ice right away to prevent skin damage. The risk of skin damage is higher if you cannot feel pain, heat, or cold.  If told, put heat on the affected area. Do this as often as told by your doctor. Use the heat source that your doctor tells you to use, such as a moist heat pack or a heating pad.  Place a towel between your skin and the heat source.  Leave the heat on for 20–30 minutes.  If your skin turns bright red, take off the heat right away to prevent burns. The risk of burns is higher if you cannot feel pain, heat, or cold.  Activity    A comparison showing the right and wrong way to lift a heavy object.  Return to your normal activities when your doctor says that it is safe.  Avoid activities that make your symptoms worse.  Take short rests during the day.  When you rest for a long time, do some physical activity or stretching between periods of rest.  Avoid sitting for a long time without moving. Get up and move around at least one time each hour.  Do exercises and stretches as told by your doctor.  Do not lift anything that is heavier than 10 lb (4.5 kg).  Avoid lifting heavy things even when you do not have symptoms.  Avoid lifting heavy things over and over.  When you lift objects, always lift in a way that is safe for your body. To do this, you should:  Bend your knees.  Keep the object close to your body.  Avoid twisting.  General instructions    Stay at a healthy weight.  Wear comfortable shoes that support your feet. Avoid wearing high heels.  Avoid sleeping on a mattress that is too soft or too hard. You might have less pain if you sleep on a mattress that is firm enough to support your back.  Contact a doctor if:  Your pain is not controlled by medicine.  Your pain does not get better.  Your pain gets worse.  Your pain lasts longer than 4 weeks.  You lose weight without trying.  Get help right away if:  You cannot control when you pee (urinate) or poop (have a bowel movement).  You have weakness in any of these areas and it gets worse:  Lower back.  The area between your hip bones.  Butt.  Legs.  You have redness or swelling of your back.  You have a burning feeling when you pee.  Summary  Sciatica is pain, weakness, tingling, or loss of feeling (numbness) along the sciatic nerve. This may include the lower back, legs, hips, and butt.  This condition happens when the sciatic nerve is pinched or has pressure put on it.  Treatment often includes rest, exercise, medicines, and putting ice or heat on the affected area.  This information is not intended to replace advice given to you by your health care provider. Make sure you discuss any questions you have with your health care provider.    Document Revised: 03/27/2023 Document Reviewed: 03/27/2023  Elsevier Patient Education © 2024 [employer]

## 2025-01-28 NOTE — ED PROVIDER NOTE - CLINICAL SUMMARY MEDICAL DECISION MAKING FREE TEXT BOX
44-year-old male with history of arthritis with complaints of right low back pain radiating down right leg intermittently x 12 hours.  Symptoms consistent with sciatica.  X-ray, pain control and reassess.

## 2025-01-28 NOTE — ED ADULT NURSE NOTE - CHIEF COMPLAINT QUOTE
BIBA from [facility] home with complaints of back pain started this AM denies trauma.

## 2025-01-28 NOTE — ED PROVIDER NOTE - NS ED MD DISPO DISCHARGE
Patient said she has been going once a day BM but not as much as she should be going. She is wondering if provider wants her to do anything else. She also is requesting a refill of linaCLOtide 145 MCG Oral Capsule (Linzess) with dose change.    Home

## 2025-01-28 NOTE — ED PROVIDER NOTE - PATIENT PORTAL LINK FT
You can access the FollowMyHealth Patient Portal offered by [facility] by registering at the following website: [email or web address]. By joining [facility]’s FollowMyHealth portal, you will also be able to view your health information using other applications (apps) compatible with our system.

## 2025-02-11 ENCOUNTER — APPOINTMENT (OUTPATIENT)
Dept: ORTHOPEDIC SURGERY | Facility: CLINIC | Age: 45
End: 2025-02-11
Payer: MEDICARE

## 2025-02-11 ENCOUNTER — APPOINTMENT (OUTPATIENT)
Dept: MRI IMAGING | Facility: CLINIC | Age: 45
End: 2025-02-11
Payer: MEDICARE

## 2025-02-11 VITALS — BODY MASS INDEX: 33.04 KG/M2 | HEIGHT: 68 IN | WEIGHT: 218 LBS

## 2025-02-11 DIAGNOSIS — S39.012D STRAIN OF MUSCLE, FASCIA AND TENDON OF LOWER BACK, SUBSEQUENT ENCOUNTER: ICD-10-CM

## 2025-02-11 PROCEDURE — 99214 OFFICE O/P EST MOD 30 MIN: CPT

## 2025-02-11 PROCEDURE — 72148 MRI LUMBAR SPINE W/O DYE: CPT

## 2025-02-11 RX ORDER — CYCLOBENZAPRINE HYDROCHLORIDE 7.5 MG/1
TABLET, FILM COATED ORAL
Refills: 0 | Status: ACTIVE | COMMUNITY

## 2025-02-11 RX ORDER — OXYCODONE HYDROCHLORIDE 5 MG/1
CAPSULE ORAL
Refills: 0 | Status: ACTIVE | COMMUNITY

## 2025-02-12 ENCOUNTER — APPOINTMENT (OUTPATIENT)
Dept: ORTHOPEDIC SURGERY | Facility: CLINIC | Age: 45
End: 2025-02-12
Payer: MEDICARE

## 2025-02-12 ENCOUNTER — NON-APPOINTMENT (OUTPATIENT)
Age: 45
End: 2025-02-12

## 2025-02-12 VITALS — HEIGHT: 68 IN | WEIGHT: 218 LBS | BODY MASS INDEX: 33.04 KG/M2

## 2025-02-12 DIAGNOSIS — M51.26 OTHER INTERVERTEBRAL DISC DISPLACEMENT, LUMBAR REGION: ICD-10-CM

## 2025-02-12 DIAGNOSIS — M47.817 SPONDYLOSIS W/OUT MYELOPATHY OR RADICULOPATHY, LUMBOSACRAL REGION: ICD-10-CM

## 2025-02-12 PROCEDURE — 99215 OFFICE O/P EST HI 40 MIN: CPT

## 2025-02-13 ENCOUNTER — NON-APPOINTMENT (OUTPATIENT)
Age: 45
End: 2025-02-13

## 2025-02-18 ENCOUNTER — OUTPATIENT (OUTPATIENT)
Dept: OUTPATIENT SERVICES | Facility: HOSPITAL | Age: 45
LOS: 1 days | End: 2025-02-18

## 2025-02-18 VITALS
OXYGEN SATURATION: 96 % | SYSTOLIC BLOOD PRESSURE: 105 MMHG | DIASTOLIC BLOOD PRESSURE: 75 MMHG | WEIGHT: 205.03 LBS | HEART RATE: 88 BPM | HEIGHT: 68 IN | TEMPERATURE: 98 F | RESPIRATION RATE: 18 BRPM

## 2025-02-18 DIAGNOSIS — Z98.890 OTHER SPECIFIED POSTPROCEDURAL STATES: Chronic | ICD-10-CM

## 2025-02-18 DIAGNOSIS — M47.817 SPONDYLOSIS WITHOUT MYELOPATHY OR RADICULOPATHY, LUMBOSACRAL REGION: ICD-10-CM

## 2025-02-18 DIAGNOSIS — J45.909 UNSPECIFIED ASTHMA, UNCOMPLICATED: Chronic | ICD-10-CM

## 2025-02-18 DIAGNOSIS — M47.816 SPONDYLOSIS WITHOUT MYELOPATHY OR RADICULOPATHY, LUMBAR REGION: ICD-10-CM

## 2025-02-18 DIAGNOSIS — Z90.5 ACQUIRED ABSENCE OF KIDNEY: Chronic | ICD-10-CM

## 2025-02-18 DIAGNOSIS — Z91.89 OTHER SPECIFIED PERSONAL RISK FACTORS, NOT ELSEWHERE CLASSIFIED: ICD-10-CM

## 2025-02-18 LAB
A1C WITH ESTIMATED AVERAGE GLUCOSE RESULT: 5.7 % — HIGH (ref 4–5.6)
ANION GAP SERPL CALC-SCNC: 13 MMOL/L — SIGNIFICANT CHANGE UP (ref 7–14)
BASOPHILS # BLD AUTO: 0.08 K/UL — SIGNIFICANT CHANGE UP (ref 0–0.2)
BASOPHILS NFR BLD AUTO: 0.9 % — SIGNIFICANT CHANGE UP (ref 0–2)
BLD GP AB SCN SERPL QL: NEGATIVE — SIGNIFICANT CHANGE UP
BUN SERPL-MCNC: 16 MG/DL — SIGNIFICANT CHANGE UP (ref 7–23)
CALCIUM SERPL-MCNC: 8.8 MG/DL — SIGNIFICANT CHANGE UP (ref 8.4–10.5)
CHLORIDE SERPL-SCNC: 100 MMOL/L — SIGNIFICANT CHANGE UP (ref 98–107)
CO2 SERPL-SCNC: 28 MMOL/L — SIGNIFICANT CHANGE UP (ref 22–31)
CREAT SERPL-MCNC: 1.12 MG/DL — SIGNIFICANT CHANGE UP (ref 0.5–1.3)
EGFR: 84 ML/MIN/1.73M2 — SIGNIFICANT CHANGE UP
EOSINOPHIL # BLD AUTO: 0.15 K/UL — SIGNIFICANT CHANGE UP (ref 0–0.5)
EOSINOPHIL NFR BLD AUTO: 1.7 % — SIGNIFICANT CHANGE UP (ref 0–6)
ESTIMATED AVERAGE GLUCOSE: 117 — SIGNIFICANT CHANGE UP
GLUCOSE SERPL-MCNC: 89 MG/DL — SIGNIFICANT CHANGE UP (ref 70–99)
HCT VFR BLD CALC: 42.4 % — SIGNIFICANT CHANGE UP (ref 39–50)
HGB BLD-MCNC: 14.1 G/DL — SIGNIFICANT CHANGE UP (ref 13–17)
IMM GRANULOCYTES NFR BLD AUTO: 0.3 % — SIGNIFICANT CHANGE UP (ref 0–0.9)
LYMPHOCYTES # BLD AUTO: 2.36 K/UL — SIGNIFICANT CHANGE UP (ref 1–3.3)
LYMPHOCYTES # BLD AUTO: 26.3 % — SIGNIFICANT CHANGE UP (ref 13–44)
MCHC RBC-ENTMCNC: 28.3 PG — SIGNIFICANT CHANGE UP (ref 27–34)
MCHC RBC-ENTMCNC: 33.3 G/DL — SIGNIFICANT CHANGE UP (ref 32–36)
MCV RBC AUTO: 85 FL — SIGNIFICANT CHANGE UP (ref 80–100)
MONOCYTES # BLD AUTO: 0.81 K/UL — SIGNIFICANT CHANGE UP (ref 0–0.9)
MONOCYTES NFR BLD AUTO: 9 % — SIGNIFICANT CHANGE UP (ref 2–14)
NEUTROPHILS # BLD AUTO: 5.55 K/UL — SIGNIFICANT CHANGE UP (ref 1.8–7.4)
NEUTROPHILS NFR BLD AUTO: 61.8 % — SIGNIFICANT CHANGE UP (ref 43–77)
PLATELET # BLD AUTO: 187 K/UL — SIGNIFICANT CHANGE UP (ref 150–400)
POTASSIUM SERPL-MCNC: 3.3 MMOL/L — LOW (ref 3.5–5.3)
POTASSIUM SERPL-SCNC: 3.3 MMOL/L — LOW (ref 3.5–5.3)
RBC # BLD: 4.99 M/UL — SIGNIFICANT CHANGE UP (ref 4.2–5.8)
RBC # FLD: 14.1 % — SIGNIFICANT CHANGE UP (ref 10.3–14.5)
RH IG SCN BLD-IMP: POSITIVE — SIGNIFICANT CHANGE UP
RH IG SCN BLD-IMP: POSITIVE — SIGNIFICANT CHANGE UP
SODIUM SERPL-SCNC: 141 MMOL/L — SIGNIFICANT CHANGE UP (ref 135–145)
WBC # BLD: 8.98 K/UL — SIGNIFICANT CHANGE UP (ref 3.8–10.5)
WBC # FLD AUTO: 8.98 K/UL — SIGNIFICANT CHANGE UP (ref 3.8–10.5)

## 2025-02-18 NOTE — H&P PST ADULT - HISTORY OF PRESENT ILLNESS
43 y/o M   intermitent lower back pain x 2 weeks, eval at [place] ED. S/P X-RAY/ MRI on 02/11/25  Denies any trauma, injury   lumbar herniated disc 45 y/o M with H/O: HTN, Asthma, Obesity, mental delay presents for pre op evaluation with c/o Sudden onset lower back pain x 2 weeks, eval at [facility]. S/P X-RAY/ MRI on 02/11/25. Per record, "There is degenerative disc disease at L4-5 and L5-S1. Massive central herniation L5-S1 with severe stenosis". Pt denies any known trauma or injury.  Now schedule for L5-S1 laminectomy discectomy tentatively on 02/27/2025

## 2025-02-18 NOTE — H&P PST ADULT - RESPIRATORY AND THORAX COMMENTS
asthma well control on Breztri- denies hospitalization or intubation, last use of rescue inhaler months ago

## 2025-02-18 NOTE — H&P PST ADULT - NSICDXPASTSURGICALHX_GEN_ALL_CORE_FT
PAST SURGICAL HISTORY:  H/O inguinal hernia repair     History of nasal surgery     History of right nephrectomy     S/P foot surgery, right

## 2025-02-18 NOTE — H&P PST ADULT - ATTENDING COMMENTS
44 M with low abck apain and L>R lower extremity radiculopathy.  Was being treated for intermittent chronic low back pain then over last 2-3 months pain severely worsened and started going down left leg.  Tired medications and PT at first.  Then got MRI L spine showing large L5-S1 herniation with extruded fragment causing severe stenosis.  Ws referred to me from other orthopedist.  Given size of disc was worried for progression to cauda equina syndrome. has no current red flag signs.  Discussed with patient as well as parents regarding this. Given his mental status unsure if he would be reliable enough to try conservative management and appropriately and quickly come back for care. Given this worry decision was made to move ahead with srugical decompression which we most likely would have had to do anyway given size of disc. Discussion with patient at length in the office and again in the preoperative holding area of risks and benefits including but not limited to bleeding, infection, damage to surrounding structures, durotomy, csf leak, iatrogenic instability, recurrent herniation, need for revision procedure, persistent or new numbness, persistent or new weakness, persistent or new radicular pain. All questions were answered with verbalized understanding. Informed consent was obtained from patients mother .  Will proceed with planned procedure L5-s1 laminectomy/discectomy

## 2025-02-18 NOTE — H&P PST ADULT - PROBLEM SELECTOR PLAN 1
Schedule for L5-S1 laminectomy discectomy tentatively on 02/27/2025. Pre op instructions, famotidine, chlorhexidine gluconate soap given and explained. Pt verbalized understanding.

## 2025-02-19 LAB
MRSA PCR RESULT.: SIGNIFICANT CHANGE UP
S AUREUS DNA NOSE QL NAA+PROBE: DETECTED

## 2025-02-21 PROBLEM — J45.909 UNSPECIFIED ASTHMA, UNCOMPLICATED: Chronic | Status: ACTIVE | Noted: 2025-02-18

## 2025-02-21 PROBLEM — Z78.9 OTHER SPECIFIED HEALTH STATUS: Chronic | Status: INACTIVE | Noted: 2021-09-25 | Resolved: 2025-02-18

## 2025-02-21 PROBLEM — I10 ESSENTIAL (PRIMARY) HYPERTENSION: Chronic | Status: ACTIVE | Noted: 2025-02-18

## 2025-02-21 RX ORDER — MUPIROCIN 20 MG/G
2 OINTMENT TOPICAL
Qty: 1 | Refills: 0 | Status: ACTIVE | COMMUNITY
Start: 2025-02-21 | End: 1900-01-01

## 2025-02-26 NOTE — ASU PATIENT PROFILE, ADULT - ACCEPTABLE
Contacted patient and verified identity using name and date of birth (2- identifiers)  SPoke with patient and advised lab order is not . He will get labs done.
Patient's lab order has . Patient requesting new lab order. Please call patient when order is ready for pick-up. Pt stated he would like them before his appt if needed.
7

## 2025-02-27 ENCOUNTER — APPOINTMENT (OUTPATIENT)
Dept: ORTHOPEDIC SURGERY | Facility: HOSPITAL | Age: 45
End: 2025-02-27
Payer: MEDICARE

## 2025-02-27 ENCOUNTER — OUTPATIENT (OUTPATIENT)
Dept: OUTPATIENT SERVICES | Facility: HOSPITAL | Age: 45
LOS: 1 days | Discharge: HOME | End: 2025-02-27
Payer: MEDICARE

## 2025-02-27 ENCOUNTER — RESULT REVIEW (OUTPATIENT)
Age: 45
End: 2025-02-27

## 2025-02-27 ENCOUNTER — TRANSCRIPTION ENCOUNTER (OUTPATIENT)
Age: 45
End: 2025-02-27

## 2025-02-27 VITALS
OXYGEN SATURATION: 98 % | WEIGHT: 205.03 LBS | RESPIRATION RATE: 14 BRPM | HEIGHT: 68 IN | HEART RATE: 71 BPM | DIASTOLIC BLOOD PRESSURE: 80 MMHG | SYSTOLIC BLOOD PRESSURE: 116 MMHG | TEMPERATURE: 98 F

## 2025-02-27 VITALS
HEART RATE: 92 BPM | OXYGEN SATURATION: 100 % | SYSTOLIC BLOOD PRESSURE: 121 MMHG | DIASTOLIC BLOOD PRESSURE: 92 MMHG | RESPIRATION RATE: 16 BRPM

## 2025-02-27 DIAGNOSIS — Z98.890 OTHER SPECIFIED POSTPROCEDURAL STATES: Chronic | ICD-10-CM

## 2025-02-27 DIAGNOSIS — M47.817 SPONDYLOSIS WITHOUT MYELOPATHY OR RADICULOPATHY, LUMBOSACRAL REGION: ICD-10-CM

## 2025-02-27 DIAGNOSIS — Z90.5 ACQUIRED ABSENCE OF KIDNEY: Chronic | ICD-10-CM

## 2025-02-27 LAB — GLUCOSE BLDC GLUCOMTR-MCNC: 96 MG/DL — SIGNIFICANT CHANGE UP (ref 70–99)

## 2025-02-27 PROCEDURE — 88304 TISSUE EXAM BY PATHOLOGIST: CPT | Mod: 26

## 2025-02-27 PROCEDURE — 63047 LAM FACETEC & FORAMOT LUMBAR: CPT

## 2025-02-27 DEVICE — BONE WAX 2.5GM: Type: IMPLANTABLE DEVICE | Status: FUNCTIONAL

## 2025-02-27 DEVICE — SURGIFLO MATRIX WITH THROMBIN KIT: Type: IMPLANTABLE DEVICE | Status: FUNCTIONAL

## 2025-02-27 RX ORDER — LEVALBUTEROL HYDROCHLORIDE 1.25 MG/3ML
2 SOLUTION RESPIRATORY (INHALATION)
Refills: 0 | DISCHARGE

## 2025-02-27 RX ORDER — OMEPRAZOLE 20 MG/1
1 CAPSULE, DELAYED RELEASE ORAL
Refills: 0 | DISCHARGE

## 2025-02-27 RX ORDER — ACETAMINOPHEN 500 MG/5ML
2 LIQUID (ML) ORAL
Qty: 0 | Refills: 0 | DISCHARGE

## 2025-02-27 RX ORDER — PREGABALIN 50 MG/1
150 CAPSULE ORAL ONCE
Refills: 0 | Status: DISCONTINUED | OUTPATIENT
Start: 2025-02-27 | End: 2025-02-27

## 2025-02-27 RX ORDER — OXYCODONE HYDROCHLORIDE 30 MG/1
5 TABLET ORAL ONCE
Refills: 0 | Status: DISCONTINUED | OUTPATIENT
Start: 2025-02-27 | End: 2025-02-27

## 2025-02-27 RX ORDER — DOCUSATE SODIUM 100 MG
1 CAPSULE ORAL
Refills: 0 | DISCHARGE

## 2025-02-27 RX ORDER — FERROUS SULFATE 137(45) MG
1 TABLET, EXTENDED RELEASE ORAL
Refills: 0 | DISCHARGE

## 2025-02-27 RX ORDER — ACETAMINOPHEN 500 MG/5ML
975 LIQUID (ML) ORAL ONCE
Refills: 0 | Status: COMPLETED | OUTPATIENT
Start: 2025-02-27 | End: 2025-02-27

## 2025-02-27 RX ORDER — LOTEPREDNOL ETABONATE 5 MG/ML
1 SUSPENSION/ DROPS OPHTHALMIC
Refills: 0 | DISCHARGE

## 2025-02-27 RX ORDER — TRAMADOL HYDROCHLORIDE 50 MG/1
50 TABLET, FILM COATED ORAL ONCE
Refills: 0 | Status: DISCONTINUED | OUTPATIENT
Start: 2025-02-27 | End: 2025-02-27

## 2025-02-27 RX ORDER — OXYCODONE HYDROCHLORIDE 30 MG/1
1 TABLET ORAL
Qty: 28 | Refills: 0
Start: 2025-02-27 | End: 2025-03-05

## 2025-02-27 RX ORDER — POVIDONE-IODINE 7.5 %
1 SOLUTION, NON-ORAL TOPICAL ONCE
Refills: 0 | Status: COMPLETED | OUTPATIENT
Start: 2025-02-27 | End: 2025-02-27

## 2025-02-27 RX ORDER — ERGOCALCIFEROL 1.25 MG/1
0 CAPSULE ORAL
Refills: 0 | DISCHARGE

## 2025-02-27 RX ORDER — CYCLOBENZAPRINE HYDROCHLORIDE 15 MG/1
1 CAPSULE, EXTENDED RELEASE ORAL
Qty: 21 | Refills: 0
Start: 2025-02-27 | End: 2025-03-05

## 2025-02-27 RX ORDER — AMMONIUM LACTATE 12 %
1 LOTION (ML) TOPICAL
Refills: 0 | DISCHARGE

## 2025-02-27 RX ORDER — SODIUM CHLORIDE 9 G/1000ML
1000 INJECTION, SOLUTION INTRAVENOUS
Refills: 0 | Status: DISCONTINUED | OUTPATIENT
Start: 2025-02-27 | End: 2025-02-27

## 2025-02-27 RX ORDER — ECONAZOLE NITRATE 1 %
1 CREAM (GRAM) TOPICAL
Refills: 0 | DISCHARGE

## 2025-02-27 RX ORDER — FLUVOXAMINE MALEATE 25 MG/1
1 TABLET, FILM COATED ORAL
Refills: 0 | DISCHARGE

## 2025-02-27 RX ORDER — BUPROPION HYDROBROMIDE 522 MG/1
1 TABLET, EXTENDED RELEASE ORAL
Refills: 0 | DISCHARGE

## 2025-02-27 RX ORDER — ONDANSETRON HCL/PF 4 MG/2 ML
4 VIAL (ML) INJECTION ONCE
Refills: 0 | Status: ACTIVE | OUTPATIENT
Start: 2025-02-27 | End: 2026-01-26

## 2025-02-27 RX ORDER — LOSARTAN POTASSIUM AND HYDROCHLOROTHIAZIDE 12.5; 5 MG/1; MG/1
1 TABLET ORAL
Refills: 0 | DISCHARGE

## 2025-02-27 RX ORDER — HYDROMORPHONE/SOD CHLOR,ISO/PF 2 MG/10 ML
0.5 SYRINGE (ML) INJECTION
Refills: 0 | Status: DISCONTINUED | OUTPATIENT
Start: 2025-02-27 | End: 2025-02-27

## 2025-02-27 RX ORDER — LORATADINE 5 MG/5ML
1 SOLUTION ORAL
Refills: 0 | DISCHARGE

## 2025-02-27 RX ADMIN — OXYCODONE HYDROCHLORIDE 5 MILLIGRAM(S): 30 TABLET ORAL at 19:00

## 2025-02-27 RX ADMIN — Medication 1 APPLICATION(S): at 14:11

## 2025-02-27 RX ADMIN — Medication 3 MILLILITER(S): at 14:11

## 2025-02-27 RX ADMIN — SODIUM CHLORIDE 30 MILLILITER(S): 9 INJECTION, SOLUTION INTRAVENOUS at 14:12

## 2025-02-27 RX ADMIN — Medication 0.5 MILLIGRAM(S): at 17:39

## 2025-02-27 RX ADMIN — Medication 40 MILLIGRAM(S): at 14:05

## 2025-02-27 RX ADMIN — OXYCODONE HYDROCHLORIDE 5 MILLIGRAM(S): 30 TABLET ORAL at 18:41

## 2025-02-27 RX ADMIN — TRAMADOL HYDROCHLORIDE 50 MILLIGRAM(S): 50 TABLET, FILM COATED ORAL at 14:05

## 2025-02-27 RX ADMIN — Medication 0.5 MILLIGRAM(S): at 18:40

## 2025-02-27 RX ADMIN — PREGABALIN 150 MILLIGRAM(S): 50 CAPSULE ORAL at 14:06

## 2025-02-27 NOTE — CHART NOTE - NSCHARTNOTEFT_GEN_A_CORE
Patient seen and evaluated in PACU s/p L5-S1 laminectomy w/ discectomy. Recovering well. VSS.    Vital Signs Last 24 Hrs  T(C): 36.3 (27 Feb 2025 19:00), Max: 36.9 (27 Feb 2025 12:53)  T(F): 97.3 (27 Feb 2025 19:00), Max: 98.4 (27 Feb 2025 12:53)  HR: 97 (27 Feb 2025 19:00) (71 - 113)  BP: 118/86 (27 Feb 2025 19:00) (96/68 - 137/94)  BP(mean): 95 (27 Feb 2025 19:00) (78 - 106)  RR: 16 (27 Feb 2025 19:00) (11 - 20)  SpO2: 98% (27 Feb 2025 19:00) (94% - 100%)    Parameters below as of 27 Feb 2025 19:00  Patient On (Oxygen Delivery Method): room air    Gen: No acute distress, dressings c/d/i. Denies radicular pain.  Resp: Breathing comfortably on room air    Motor exam:          Upper extremity         C5 (Shoulder Abd)    C6 (Elbow flex)   C7 (Elbow ext)   C8 (Finger flex) T1 (finger abd)         R         5/5                 5/5                       5/5                      5/5                         5/5          L          5/5                 5/5                      5/5                      5/5                         5/5                   Lower extremity           L2 (Hip flex)  L3 (knee ext)  L4 (Dorsi flex)  L5 (EHL)  S1 (Plantar flex)                                               R        5/5            5/5             5/5                    5/5          5/5      L        5/5            5/5             5/5                   5/5           5/5      Sensory exam:                        C5      C6      C7      C8       T1          RIGHT          2         2        2         2         2          (0=absent, 1=impaired, 2=normal, NT=not testable)  LEFT             2         2        2         2         2          (0=absent, 1=impaired, 2=normal, NT=not testable)                          L2      L3     L4     L5       S1          RIGHT          2         2        2         2         2          (0=absent, 1=impaired, 2=normal, NT=not testable)  LEFT             2         2        2         2         2          (0=absent, 1=impaired, 2=normal, NT=not testable)       A: s/p L5-S1 laminectomy w/ discectomy. Recovering well. VSS.    P:  - PACU to home   - F/U in 2 weeks with Dr. Sanchez  - Reports pain is minimal and mainly at incision, continue with pain control. Pain meds sent to [facility]   - D/c when meets criteria in orders. Stable for d/c from orthopedic perspective     MD DANISHA Gifford/[facility] Orthopaedic Surgery Resident PGY1    For any questions, please DO NOT reach out via Teams.  [facility] [phone]  [facility] [phone]  [facility] [phone]

## 2025-02-27 NOTE — ASU DISCHARGE PLAN (ADULT/PEDIATRIC) - FINANCIAL ASSISTANCE
[facility] provides services at a reduced cost to those who are determined to be eligible through [facility]’s financial assistance program. Information regarding [facility]’s financial assistance program can be found by going to [email or web address] or by calling [phone].

## 2025-02-27 NOTE — ASU DISCHARGE PLAN (ADULT/PEDIATRIC) - CARE PROVIDER_API CALL
Burt Sanchez  Orthopaedic Surgery  [address] [place] [address]  Phone: [phone]  Fax: [phone]  Follow Up Time: 2 weeks    Burt Sanchez  Orthopaedic Surgery  [address]  [place] [address]  Phone: [phone]  Fax: ()-  Follow Up Time: 2 weeks

## 2025-02-27 NOTE — ASU DISCHARGE PLAN (ADULT/PEDIATRIC) - CALL YOUR DOCTOR IF YOU HAVE ANY OF THE FOLLOWING:
Bleeding that does not stop/Pain not relieved by Medications/Fever greater than (need to indicate Fahrenheit or Celsius)/Wound/Surgical Site with redness, or foul smelling discharge or pus/Numbness, tingling, color or temperature change to extremity/Unable to urinate

## 2025-02-27 NOTE — ASU DISCHARGE PLAN (ADULT/PEDIATRIC) - ASU DC SPECIAL INSTRUCTIONSFT
Please follow up with Dr Sanchez in 2 weeks after discharge.  Call office to make an appointment.    Please follow up with your primary care physician as your medications may have changed.      Meds:  Avoid any aspirin or other anti-inflammatory medications unless otherwise specified by your surgeon. Take tylenol at regular intervals for the first 2 weeks after surgery for pain.    Take narcotic pain meds as prescribed, only as needed.      Bandages and restrictions:  You are encouraged to walk as much as you are able to tolerate, but please avoid any heavy lifting, bending, squatting, twisting motions.  Keep dressing and/or incision clean. You may shower with the current bandage the day after surgery but DO NOT allow water to directly hit the bandage. On day 3 after surgery you may begin replacing the bandages with gauze and tegaderm dressings to protect the incision.  You may shower, but please avoid aiming shower stream directly onto incision.  Allow water to run over dressing/incision, lightly pat dry.

## 2025-02-27 NOTE — ASU DISCHARGE PLAN (ADULT/PEDIATRIC) - FOLLOW UP APPOINTMENTS
[facility] may also call Recovery Room (PACU) 24/7 @ [phone]/[facility]

## 2025-02-27 NOTE — ASU DISCHARGE PLAN (ADULT/PEDIATRIC) - PROVIDER TOKENS
PROVIDER:[TOKEN:[[id number]],FOLLOWUP:[2 weeks]],PROVIDER:[TOKEN:[[id number]],FOLLOWUP:[2 weeks]]

## 2025-02-27 NOTE — BRIEF OPERATIVE NOTE - NSICDXBRIEFPREOP_GEN_ALL_CORE_FT
PRE-OP DIAGNOSIS:  Herniation of lumbar intervertebral disc 27-Feb-2025 17:29:38 L5-S1 Suresh Roman  Lumbar radiculopathy 27-Feb-2025 17:29:55  Suresh Roman

## 2025-02-27 NOTE — BRIEF OPERATIVE NOTE - NSICDXBRIEFPOSTOP_GEN_ALL_CORE_FT
POST-OP DIAGNOSIS:  Herniation of lumbar intervertebral disc 27-Feb-2025 17:30:19 L5-S1 Suresh Roman  Lumbar radiculopathy 27-Feb-2025 17:30:25  Suresh Roman

## 2025-02-27 NOTE — BRIEF OPERATIVE NOTE - NSICDXBRIEFPROCEDURE_GEN_ALL_CORE_FT
PROCEDURES:  Laminectomy, spine, lumbar, 1 level, with discectomy 27-Feb-2025 17:29:15 L5-S1 Suresh Roman

## 2025-03-01 ENCOUNTER — NON-APPOINTMENT (OUTPATIENT)
Age: 45
End: 2025-03-01

## 2025-03-04 LAB — SURGICAL PATHOLOGY STUDY: SIGNIFICANT CHANGE UP

## 2025-03-06 RX ORDER — METHYLPREDNISOLONE 4 MG/1
4 TABLET ORAL
Qty: 1 | Refills: 1 | Status: ACTIVE | COMMUNITY
Start: 2025-03-06 | End: 1900-01-01

## 2025-03-12 ENCOUNTER — APPOINTMENT (OUTPATIENT)
Dept: ORTHOPEDIC SURGERY | Facility: CLINIC | Age: 45
End: 2025-03-12
Payer: MEDICARE

## 2025-03-12 VITALS — WEIGHT: 218 LBS | BODY MASS INDEX: 33.04 KG/M2 | HEIGHT: 68 IN

## 2025-03-12 DIAGNOSIS — M25.561 PAIN IN RIGHT KNEE: ICD-10-CM

## 2025-03-12 DIAGNOSIS — M25.562 PAIN IN RIGHT KNEE: ICD-10-CM

## 2025-03-12 PROCEDURE — 72100 X-RAY EXAM L-S SPINE 2/3 VWS: CPT

## 2025-03-12 PROCEDURE — 99024 POSTOP FOLLOW-UP VISIT: CPT

## 2025-04-16 ENCOUNTER — NON-APPOINTMENT (OUTPATIENT)
Age: 45
End: 2025-04-16

## 2025-04-16 ENCOUNTER — APPOINTMENT (OUTPATIENT)
Dept: ORTHOPEDIC SURGERY | Facility: CLINIC | Age: 45
End: 2025-04-16
Payer: MEDICARE

## 2025-04-16 VITALS — BODY MASS INDEX: 31.07 KG/M2 | WEIGHT: 205 LBS | HEIGHT: 68 IN

## 2025-04-16 DIAGNOSIS — Z98.890 OTHER SPECIFIED POSTPROCEDURAL STATES: ICD-10-CM

## 2025-04-16 PROCEDURE — 72100 X-RAY EXAM L-S SPINE 2/3 VWS: CPT

## 2025-04-16 PROCEDURE — 99024 POSTOP FOLLOW-UP VISIT: CPT

## 2025-05-28 ENCOUNTER — APPOINTMENT (OUTPATIENT)
Dept: ORTHOPEDIC SURGERY | Facility: CLINIC | Age: 45
End: 2025-05-28
Payer: MEDICARE

## 2025-05-28 ENCOUNTER — NON-APPOINTMENT (OUTPATIENT)
Age: 45
End: 2025-05-28

## 2025-05-28 VITALS — BODY MASS INDEX: 31.07 KG/M2 | HEIGHT: 68 IN | WEIGHT: 205 LBS

## 2025-05-28 DIAGNOSIS — Z98.890 OTHER SPECIFIED POSTPROCEDURAL STATES: ICD-10-CM

## 2025-05-28 PROCEDURE — 99024 POSTOP FOLLOW-UP VISIT: CPT

## 2025-07-01 ENCOUNTER — APPOINTMENT (OUTPATIENT)
Dept: ORTHOPEDIC SURGERY | Facility: CLINIC | Age: 45
End: 2025-07-01
Payer: MEDICARE

## 2025-07-01 VITALS — BODY MASS INDEX: 31.07 KG/M2 | WEIGHT: 205 LBS | HEIGHT: 68 IN

## 2025-07-01 PROCEDURE — 73562 X-RAY EXAM OF KNEE 3: CPT | Mod: RT

## 2025-07-01 PROCEDURE — 99214 OFFICE O/P EST MOD 30 MIN: CPT

## 2025-09-03 ENCOUNTER — APPOINTMENT (OUTPATIENT)
Dept: ORTHOPEDIC SURGERY | Facility: CLINIC | Age: 45
End: 2025-09-03
Payer: MEDICARE

## 2025-09-03 VITALS — HEIGHT: 68 IN | BODY MASS INDEX: 30.31 KG/M2 | WEIGHT: 200 LBS

## 2025-09-03 DIAGNOSIS — M51.26 OTHER INTERVERTEBRAL DISC DISPLACEMENT, LUMBAR REGION: ICD-10-CM

## 2025-09-03 DIAGNOSIS — M47.817 SPONDYLOSIS W/OUT MYELOPATHY OR RADICULOPATHY, LUMBOSACRAL REGION: ICD-10-CM

## 2025-09-03 DIAGNOSIS — Z98.890 OTHER SPECIFIED POSTPROCEDURAL STATES: ICD-10-CM

## 2025-09-03 PROCEDURE — 99213 OFFICE O/P EST LOW 20 MIN: CPT

## (undated) DEVICE — Device

## (undated) DEVICE — SOL IRR POUR H2O 500ML

## (undated) DEVICE — SUT VICRYL PLUS 2-0 18" CP-2 UNDYED (POP-OFF)

## (undated) DEVICE — VENODYNE/SCD SLEEVE CALF MEDIUM

## (undated) DEVICE — DRAPE TOWEL BLUE 17" X 24"

## (undated) DEVICE — BIPOLAR FORCEP HENSLER BAYONET 10" X 1MM (YELLOW)

## (undated) DEVICE — SPONGE DISSECTOR PEANUT

## (undated) DEVICE — DRAPE C ARM C-ARMOUR

## (undated) DEVICE — ELCTR BOVIE TIP BLADE INSULATED 2.75" EDGE

## (undated) DEVICE — MIDAS REX LEGEND MATCH HEAD FLUTED LG BORE 3.0MM X 14CM

## (undated) DEVICE — DRSG TELFA 3 X 8

## (undated) DEVICE — WARMING BLANKET UPPER ADULT

## (undated) DEVICE — SUT VICRYL 3-0 18" SH UNDYED (POP-OFF)

## (undated) DEVICE — DRAPE MAYO STAND 30"

## (undated) DEVICE — PREP DURAPREP 26CC

## (undated) DEVICE — TAPE SILK 3"

## (undated) DEVICE — GLV 8 PROTEXIS (WHITE)

## (undated) DEVICE — POSITIONER CUSHION INSERT PRONE VIEW LG

## (undated) DEVICE — TUBING FOR SMOKE EVACUATOR (PURPLE END)

## (undated) DEVICE — MIDAS REX MR8 MATCH HEAD FLUTED LG BORE 3MM X 14CM

## (undated) DEVICE — DRAPE GENERAL ENDOSCOPY

## (undated) DEVICE — DRAPE COVER SNAP 36X30"

## (undated) DEVICE — DRAPE SURGICAL #1010

## (undated) DEVICE — MIDAS REX MR7 LUBRICANT DIFFUSER CARTRIDGE

## (undated) DEVICE — ELCTR BOVIE TIP BLADE INSULATED 6.5" EDGE

## (undated) DEVICE — DRAPE 3/4 SHEET 52X76"

## (undated) DEVICE — ELCTR BOVIE TIP BLADE INSULATED 4" EDGE

## (undated) DEVICE — POSITIONER FOAM EGG CRATE ULNAR 2PCS (PINK)

## (undated) DEVICE — DRAPE LIGHT HANDLE COVER (GREEN)

## (undated) DEVICE — DRSG BENZOIN 0.6CC

## (undated) DEVICE — SUT VICRYL PLUS 0 18" CT-1 UNDYED (POP-OFF)

## (undated) DEVICE — POSITIONER STRAP ARMBOARD VELCRO TS-30

## (undated) DEVICE — ELCTR BOVIE PENCIL BLADE 10FT

## (undated) DEVICE — GOWN XXXL

## (undated) DEVICE — ELCTR GROUNDING PAD ADULT COVIDIEN

## (undated) DEVICE — DRAIN RESERVOIR FOR JACKSON PRATT 100CC CARDINAL

## (undated) DEVICE — SUCTION YANKAUER NO CONTROL VENT

## (undated) DEVICE — DRAPE BACK TABLE COVER 44X90"

## (undated) DEVICE — SOL IRR POUR NS 0.9% 1000ML

## (undated) DEVICE — GLV 9 PROTEXIS (WHITE)

## (undated) DEVICE — DRAPE MAYO STAND 23"

## (undated) DEVICE — DRAPE INSTRUMENT POUCH 6.75" X 11"

## (undated) DEVICE — POSITIONER JACKSON TABLE CHEST, HIP, THIGH PADS, ARM CRADLE

## (undated) DEVICE — FOLEY TRAY 16FR 5CC LF UMETER CLOSED

## (undated) DEVICE — GOWN XL

## (undated) DEVICE — CANISTER DISPOSABLE THIN WALL 3000CC

## (undated) DEVICE — DRSG CURITY GAUZE SPONGE 4 X 4" 12-PLY